# Patient Record
Sex: FEMALE | Race: WHITE | NOT HISPANIC OR LATINO | URBAN - METROPOLITAN AREA
[De-identification: names, ages, dates, MRNs, and addresses within clinical notes are randomized per-mention and may not be internally consistent; named-entity substitution may affect disease eponyms.]

---

## 2020-09-25 ENCOUNTER — APPOINTMENT (RX ONLY)
Dept: URBAN - METROPOLITAN AREA CLINIC 154 | Facility: CLINIC | Age: 30
Setting detail: DERMATOLOGY
End: 2020-09-25

## 2020-09-25 DIAGNOSIS — L90.5 SCAR CONDITIONS AND FIBROSIS OF SKIN: ICD-10-CM

## 2020-09-25 PROCEDURE — ? OTHER (COSMETIC)

## 2020-09-25 NOTE — PROCEDURE: OTHER (COSMETIC)
Price (Use Numbers Only, No Special Characters Or $): 50
Other (Free Text): Discussed in detail with patient today many types of options for scar treatment including CO2 laser (which she personally wanted to do as first option), skin pen, Venus viva, chemical peels, sunscreen use daily, vit c serum daily and Retin a qhs. Whole face $3000 per dr Mercado, if patient wishes to have this [place]
Detail Level: Zone

## 2020-09-25 NOTE — HPI: SCAR
How Severe Is Your Scar?: mild
Is This A New Presentation, Or A Follow-Up?: Scars
Additional History: Patient wants to discuss treatment options cosmetically for scarring on face